# Patient Record
Sex: MALE | Race: WHITE | NOT HISPANIC OR LATINO | URBAN - METROPOLITAN AREA
[De-identification: names, ages, dates, MRNs, and addresses within clinical notes are randomized per-mention and may not be internally consistent; named-entity substitution may affect disease eponyms.]

---

## 2023-12-18 ENCOUNTER — CLAIMS CREATED FROM THE CLAIM WINDOW (OUTPATIENT)
Dept: URBAN - METROPOLITAN AREA CLINIC 40 | Facility: CLINIC | Age: 73
End: 2023-12-18
Payer: MEDICARE

## 2023-12-18 ENCOUNTER — LAB OUTSIDE AN ENCOUNTER (OUTPATIENT)
Dept: URBAN - METROPOLITAN AREA CLINIC 40 | Facility: CLINIC | Age: 73
End: 2023-12-18

## 2023-12-18 VITALS — HEIGHT: 68 IN

## 2023-12-18 DIAGNOSIS — K52.9 CHRONIC DIARRHEA: ICD-10-CM

## 2023-12-18 DIAGNOSIS — K52.9 ILEITIS: ICD-10-CM

## 2023-12-18 DIAGNOSIS — R10.84 GENERALIZED ABDOMINAL PAIN: ICD-10-CM

## 2023-12-18 DIAGNOSIS — K64.8 HEMORRHOIDS, INTERNAL: ICD-10-CM

## 2023-12-18 DIAGNOSIS — R19.7 ACUTE DIARRHEA: ICD-10-CM

## 2023-12-18 PROCEDURE — 99204 OFFICE O/P NEW MOD 45 MIN: CPT | Performed by: INTERNAL MEDICINE

## 2023-12-18 RX ORDER — IBUPROFEN 800 MG/1
TABLET, FILM COATED ORAL
Qty: 18 TABLET | Status: ACTIVE | COMMUNITY

## 2023-12-18 RX ORDER — MUPIROCIN 20 MG/G
APPLY TO SURGERY SITE TWICE A DAY FOR 2 WEEKS THEN USE VASELINE OINTMENT TOPICAL
Qty: 22 GRAM | Refills: 0 | Status: ACTIVE | COMMUNITY

## 2023-12-18 RX ORDER — CEPHALEXIN 500 MG/1
TAKE 1 CAPSULE BY MOUTH THREE TIMES A DAY FOR 7 DAYS CAPSULE ORAL
Qty: 21 EACH | Refills: 0 | Status: ACTIVE | COMMUNITY

## 2023-12-18 RX ORDER — TAMSULOSIN HYDROCHLORIDE 0.4 MG/1
CAPSULE ORAL
Qty: 90 CAPSULE | Status: ACTIVE | COMMUNITY

## 2023-12-18 RX ORDER — COLESTIPOL HYDROCHLORIDE 1 G/1
1 TABLET TABLET, FILM COATED ORAL TWICE A DAY
Qty: 180 TABLET | Refills: 3 | OUTPATIENT
Start: 2023-12-18

## 2023-12-18 RX ORDER — CYCLOBENZAPRINE 5 MG/1
TABLET, FILM COATED ORAL
Qty: 7 TABLET | Status: ACTIVE | COMMUNITY

## 2023-12-18 RX ORDER — AZITHROMYCIN DIHYDRATE 250 MG/1
TAKE 2 TABLETS BY MOUTH TODAY, THEN TAKE 1 TABLET DAILY FOR 4 DAYS TABLET ORAL
Qty: 6 EACH | Refills: 0 | Status: ACTIVE | COMMUNITY

## 2023-12-18 RX ORDER — OMEPRAZOLE 40 MG/1
TAKE 1 CAPSULE BY MOUTH ONCE DAILY CAPSULE, DELAYED RELEASE ORAL
Qty: 90 EACH | Refills: 0 | Status: ACTIVE | COMMUNITY

## 2023-12-18 RX ORDER — NIRMATRELVIR AND RITONAVIR 300-100 MG
TAKE 1 TABLET (ORAL) AS DIRECTED ON DOSE PACK KIT ORAL
Qty: 30 EACH | Refills: 0 | Status: ACTIVE | COMMUNITY

## 2023-12-18 RX ORDER — LORATADINE 10 MG/1
TAKE 1 TABLET BY MOUTH EVERY DAY TABLET ORAL
Qty: 90 EACH | Refills: 0 | Status: ACTIVE | COMMUNITY

## 2023-12-18 RX ORDER — CEPHALEXIN 500 MG/1
CAPSULE ORAL
Qty: 21 CAPSULE | Status: ACTIVE | COMMUNITY

## 2023-12-18 NOTE — HPI-TODAY'S VISIT:
Pt presents for management of chronic diarrhea. Colonoscopy 2021 at [facility], reportedly 1 polyp (told "benign") but no records to confirm. Hx IBS Hx intermittent loose stools, now with loose stools daily, 2-3 daily, no bleeding, no tenesmus. Some cramps with relief after BMs.  Told in the past he had inflammation of "ileum". But no records to confirm. Bloating, gas, cramps. No a/a factors. Hemorrhoids. Pending hemorrhoid surgery 1/2024 in a few weeks with a surgeon at [facility]. Normal wt.  No other red flag s/s.

## 2023-12-22 ENCOUNTER — TELEPHONE ENCOUNTER (OUTPATIENT)
Dept: URBAN - METROPOLITAN AREA CLINIC 40 | Facility: CLINIC | Age: 73
End: 2023-12-22

## 2023-12-22 ENCOUNTER — LAB OUTSIDE AN ENCOUNTER (OUTPATIENT)
Dept: URBAN - METROPOLITAN AREA CLINIC 40 | Facility: CLINIC | Age: 73
End: 2023-12-22

## 2023-12-26 ENCOUNTER — TELEPHONE ENCOUNTER (OUTPATIENT)
Dept: URBAN - METROPOLITAN AREA CLINIC 74 | Facility: CLINIC | Age: 73
End: 2023-12-26

## 2023-12-26 ENCOUNTER — LAB OUTSIDE AN ENCOUNTER (OUTPATIENT)
Dept: URBAN - METROPOLITAN AREA CLINIC 74 | Facility: CLINIC | Age: 73
End: 2023-12-26

## 2023-12-27 LAB
ADENOVIRUS F 40/41: NOT DETECTED
CALPROTECTIN, STOOL - QDX: (no result)
CAMPYLOBACTER: NOT DETECTED
CLOSTRIDIUM DIFFICILE: NOT DETECTED
ENTAMOEBA HISTOLYTICA: NOT DETECTED
ENTEROAGGREGATIVE E.COLI: NOT DETECTED
ENTEROTOXIGENIC E.COLI: NOT DETECTED
ESCHERICHIA COLI O157: NOT DETECTED
GIARDIA LAMBLIA: NOT DETECTED
NOROVIRUS GI/GII: NOT DETECTED
PANCREATICELASTASE ELISA, STOOL: (no result)
ROTAVIRUS A: NOT DETECTED
SALMONELLA SPP.: NOT DETECTED
SHIGA-LIKE TOXIN PRODUCING E.COLI: NOT DETECTED
SHIGELLA SPP. / ENTEROINVASIVE E.COLI: NOT DETECTED
VIBRIO PARAHAEMOLYTICUS: NOT DETECTED
VIBRIO SPP.: NOT DETECTED
YERSINIA ENTEROCOLITICA: NOT DETECTED

## 2024-01-15 ENCOUNTER — WEB ENCOUNTER (OUTPATIENT)
Dept: URBAN - METROPOLITAN AREA CLINIC 40 | Facility: CLINIC | Age: 74
End: 2024-01-15

## 2024-01-25 ENCOUNTER — TELEPHONE ENCOUNTER (OUTPATIENT)
Dept: URBAN - METROPOLITAN AREA CLINIC 74 | Facility: CLINIC | Age: 74
End: 2024-01-25

## 2024-02-15 ENCOUNTER — COLON (OUTPATIENT)
Dept: URBAN - METROPOLITAN AREA SURGERY CENTER 30 | Facility: SURGERY CENTER | Age: 74
End: 2024-02-15

## 2024-02-22 ENCOUNTER — OV EP (OUTPATIENT)
Dept: URBAN - METROPOLITAN AREA CLINIC 40 | Facility: CLINIC | Age: 74
End: 2024-02-22

## 2024-03-15 ENCOUNTER — OV EP (OUTPATIENT)
Dept: URBAN - METROPOLITAN AREA CLINIC 40 | Facility: CLINIC | Age: 74
End: 2024-03-15

## 2024-03-15 RX ORDER — NIRMATRELVIR AND RITONAVIR 300-100 MG
TAKE 1 TABLET (ORAL) AS DIRECTED ON DOSE PACK KIT ORAL
Qty: 30 EACH | Refills: 0 | COMMUNITY

## 2024-03-15 RX ORDER — CYCLOBENZAPRINE 5 MG/1
TABLET, FILM COATED ORAL
Qty: 7 TABLET | COMMUNITY

## 2024-03-15 RX ORDER — MUPIROCIN 20 MG/G
APPLY TO SURGERY SITE TWICE A DAY FOR 2 WEEKS THEN USE VASELINE OINTMENT TOPICAL
Qty: 22 GRAM | Refills: 0 | COMMUNITY

## 2024-03-15 RX ORDER — LORATADINE 10 MG/1
TAKE 1 TABLET BY MOUTH EVERY DAY TABLET ORAL
Qty: 90 EACH | Refills: 0 | COMMUNITY

## 2024-03-15 RX ORDER — CEPHALEXIN 500 MG/1
TAKE 1 CAPSULE BY MOUTH THREE TIMES A DAY FOR 7 DAYS CAPSULE ORAL
Qty: 21 EACH | Refills: 0 | COMMUNITY

## 2024-03-15 RX ORDER — IBUPROFEN 800 MG/1
TABLET, FILM COATED ORAL
Qty: 18 TABLET | COMMUNITY

## 2024-03-15 RX ORDER — COLESTIPOL HYDROCHLORIDE 1 G/1
1 TABLET TABLET, FILM COATED ORAL TWICE A DAY
Qty: 180 TABLET | Refills: 3 | COMMUNITY
Start: 2023-12-18

## 2024-03-15 RX ORDER — OMEPRAZOLE 40 MG/1
TAKE 1 CAPSULE BY MOUTH ONCE DAILY CAPSULE, DELAYED RELEASE ORAL
Qty: 90 EACH | Refills: 0 | COMMUNITY

## 2024-03-15 RX ORDER — AZITHROMYCIN DIHYDRATE 250 MG/1
TAKE 2 TABLETS BY MOUTH TODAY, THEN TAKE 1 TABLET DAILY FOR 4 DAYS TABLET ORAL
Qty: 6 EACH | Refills: 0 | COMMUNITY

## 2024-03-15 RX ORDER — TAMSULOSIN HYDROCHLORIDE 0.4 MG/1
CAPSULE ORAL
Qty: 90 CAPSULE | COMMUNITY

## 2024-03-15 RX ORDER — CEPHALEXIN 500 MG/1
CAPSULE ORAL
Qty: 21 CAPSULE | COMMUNITY

## 2024-03-21 ENCOUNTER — COLON (OUTPATIENT)
Dept: URBAN - METROPOLITAN AREA SURGERY CENTER 30 | Facility: SURGERY CENTER | Age: 74
End: 2024-03-21
Payer: MEDICARE

## 2024-03-21 ENCOUNTER — LAB (OUTPATIENT)
Dept: URBAN - METROPOLITAN AREA CLINIC 4 | Facility: CLINIC | Age: 74
End: 2024-03-21
Payer: MEDICARE

## 2024-03-21 DIAGNOSIS — K50.00 CROHN'S DISEASE OF SMALL INTESTINE WITHOUT COMPLICATIONS: ICD-10-CM

## 2024-03-21 DIAGNOSIS — R93.3 ABN FINDINGS-GI TRACT: ICD-10-CM

## 2024-03-21 DIAGNOSIS — R19.7 ACUTE DIARRHEA: ICD-10-CM

## 2024-03-21 PROCEDURE — 45380 COLONOSCOPY AND BIOPSY: CPT | Performed by: INTERNAL MEDICINE

## 2024-03-21 PROCEDURE — 88305 TISSUE EXAM BY PATHOLOGIST: CPT | Performed by: PATHOLOGY

## 2024-03-21 RX ORDER — COLESTIPOL HYDROCHLORIDE 1 G/1
1 TABLET TABLET, FILM COATED ORAL TWICE A DAY
Qty: 180 TABLET | Refills: 3 | COMMUNITY
Start: 2023-12-18

## 2024-03-21 RX ORDER — TAMSULOSIN HYDROCHLORIDE 0.4 MG/1
CAPSULE ORAL
Qty: 90 CAPSULE | COMMUNITY

## 2024-03-21 RX ORDER — NIRMATRELVIR AND RITONAVIR 300-100 MG
TAKE 1 TABLET (ORAL) AS DIRECTED ON DOSE PACK KIT ORAL
Qty: 30 EACH | Refills: 0 | COMMUNITY

## 2024-03-21 RX ORDER — OMEPRAZOLE 40 MG/1
TAKE 1 CAPSULE BY MOUTH ONCE DAILY CAPSULE, DELAYED RELEASE ORAL
Qty: 90 EACH | Refills: 0 | COMMUNITY

## 2024-03-21 RX ORDER — CEPHALEXIN 500 MG/1
TAKE 1 CAPSULE BY MOUTH THREE TIMES A DAY FOR 7 DAYS CAPSULE ORAL
Qty: 21 EACH | Refills: 0 | COMMUNITY

## 2024-03-21 RX ORDER — CYCLOBENZAPRINE 5 MG/1
TABLET, FILM COATED ORAL
Qty: 7 TABLET | COMMUNITY

## 2024-03-21 RX ORDER — IBUPROFEN 800 MG/1
TABLET, FILM COATED ORAL
Qty: 18 TABLET | COMMUNITY

## 2024-03-21 RX ORDER — CEPHALEXIN 500 MG/1
CAPSULE ORAL
Qty: 21 CAPSULE | COMMUNITY

## 2024-03-21 RX ORDER — MUPIROCIN 20 MG/G
APPLY TO SURGERY SITE TWICE A DAY FOR 2 WEEKS THEN USE VASELINE OINTMENT TOPICAL
Qty: 22 GRAM | Refills: 0 | COMMUNITY

## 2024-03-21 RX ORDER — LORATADINE 10 MG/1
TAKE 1 TABLET BY MOUTH EVERY DAY TABLET ORAL
Qty: 90 EACH | Refills: 0 | COMMUNITY

## 2024-03-21 RX ORDER — AZITHROMYCIN DIHYDRATE 250 MG/1
TAKE 2 TABLETS BY MOUTH TODAY, THEN TAKE 1 TABLET DAILY FOR 4 DAYS TABLET ORAL
Qty: 6 EACH | Refills: 0 | COMMUNITY

## 2024-03-21 NOTE — HPI-TODAY'S VISIT:
Colonoscopy 3/21/24 conirmed moderate ileitis and likely Crohn's disease. Bx pending. He is doing well on Colestipol bid. Diarrhea resolved, no pain. Fecal Calprotectin elevated 120.3. EPI negative. Plan: Follow up to review pathology and start Rx on follow up, likely Entocort.

## 2024-04-19 ENCOUNTER — OV EP (OUTPATIENT)
Dept: URBAN - METROPOLITAN AREA CLINIC 40 | Facility: CLINIC | Age: 74
End: 2024-04-19
Payer: MEDICARE

## 2024-04-19 VITALS
HEIGHT: 68 IN | WEIGHT: 201 LBS | BODY MASS INDEX: 30.46 KG/M2 | SYSTOLIC BLOOD PRESSURE: 164 MMHG | DIASTOLIC BLOOD PRESSURE: 66 MMHG | TEMPERATURE: 97.7 F | OXYGEN SATURATION: 97 % | HEART RATE: 97 BPM

## 2024-04-19 DIAGNOSIS — R19.7 ACUTE DIARRHEA: ICD-10-CM

## 2024-04-19 DIAGNOSIS — R10.84 GENERALIZED ABDOMINAL PAIN: ICD-10-CM

## 2024-04-19 DIAGNOSIS — K52.9 ILEITIS: ICD-10-CM

## 2024-04-19 DIAGNOSIS — K50.00 CICATRIZING ENTEROCOLITIS: ICD-10-CM

## 2024-04-19 PROBLEM — 56689002: Status: ACTIVE | Noted: 2024-04-19

## 2024-04-19 PROCEDURE — 99214 OFFICE O/P EST MOD 30 MIN: CPT | Performed by: INTERNAL MEDICINE

## 2024-04-19 RX ORDER — OMEPRAZOLE 40 MG/1
TAKE 1 CAPSULE BY MOUTH ONCE DAILY CAPSULE, DELAYED RELEASE ORAL
Qty: 90 EACH | Refills: 0 | Status: ACTIVE | COMMUNITY

## 2024-04-19 RX ORDER — EZETIMIBE 10 MG/1
1 TABLET TABLET ORAL ONCE A DAY
Status: ACTIVE | COMMUNITY

## 2024-04-19 RX ORDER — LORATADINE 10 MG/1
TAKE 1 TABLET BY MOUTH EVERY DAY TABLET ORAL
Qty: 90 EACH | Refills: 0 | Status: ACTIVE | COMMUNITY

## 2024-04-19 RX ORDER — MUPIROCIN 20 MG/G
APPLY TO SURGERY SITE TWICE A DAY FOR 2 WEEKS THEN USE VASELINE OINTMENT TOPICAL
Qty: 22 GRAM | Refills: 0 | Status: ON HOLD | COMMUNITY

## 2024-04-19 RX ORDER — CEPHALEXIN 500 MG/1
TAKE 1 CAPSULE BY MOUTH THREE TIMES A DAY FOR 7 DAYS CAPSULE ORAL
Qty: 21 EACH | Refills: 0 | Status: ON HOLD | COMMUNITY

## 2024-04-19 RX ORDER — NIRMATRELVIR AND RITONAVIR 300-100 MG
TAKE 1 TABLET (ORAL) AS DIRECTED ON DOSE PACK KIT ORAL
Qty: 30 EACH | Refills: 0 | Status: ON HOLD | COMMUNITY

## 2024-04-19 RX ORDER — BUDESONIDE 3 MG/1
3 CAPSULES CAPSULE, DELAYED RELEASE PELLETS ORAL ONCE A DAY
Qty: 270 CAPSULE | Refills: 3 | OUTPATIENT
Start: 2024-04-19

## 2024-04-19 RX ORDER — COLESTIPOL HYDROCHLORIDE 1 G/1
1 TABLET TABLET, FILM COATED ORAL TWICE A DAY
Qty: 180 TABLET | Refills: 3 | OUTPATIENT

## 2024-04-19 RX ORDER — TAMSULOSIN HYDROCHLORIDE 0.4 MG/1
CAPSULE ORAL
Qty: 90 CAPSULE | Status: ACTIVE | COMMUNITY

## 2024-04-19 RX ORDER — AZITHROMYCIN DIHYDRATE 250 MG/1
TAKE 2 TABLETS BY MOUTH TODAY, THEN TAKE 1 TABLET DAILY FOR 4 DAYS TABLET ORAL
Qty: 6 EACH | Refills: 0 | Status: ON HOLD | COMMUNITY

## 2024-04-19 RX ORDER — CEPHALEXIN 500 MG/1
CAPSULE ORAL
Qty: 21 CAPSULE | Status: ON HOLD | COMMUNITY

## 2024-04-19 RX ORDER — CYCLOBENZAPRINE 5 MG/1
TABLET, FILM COATED ORAL
Qty: 7 TABLET | Status: ON HOLD | COMMUNITY

## 2024-04-19 RX ORDER — COLESTIPOL HYDROCHLORIDE 1 G/1
1 TABLET TABLET, FILM COATED ORAL TWICE A DAY
Qty: 180 TABLET | Refills: 3 | Status: ACTIVE | COMMUNITY
Start: 2023-12-18

## 2024-04-19 RX ORDER — IBUPROFEN 800 MG/1
TABLET, FILM COATED ORAL
Qty: 18 TABLET | Status: ACTIVE | COMMUNITY

## 2024-04-19 NOTE — HPI-TODAY'S VISIT:
-Colonoscopy 3/21/24 conirmed moderate ileitis and likely Crohn's disease. -CT enterography confirmed ileitis, moderate inflammation of the distal ileum without proximal dilation.  No evidence of fistula or abscess.  Colon was normal.  Bladder thickened.  Suspicion for Crohn's disease. -Colonoscopy March 2024 confirmed Crohn's disease of the terminal ileum, moderate active ileitis without stricture.  Biopsies from the ileum confirmed chronic active ileitis.  Biopsies from the colon were negative. -He presented initially for evaluation of hemorrhoids.  He was anticipating hemorrhoid surgery.  I now suspect he has perianal Crohn's disease along with his hemorrhoids and I do not recommend surgery. -He is doing well on Colestipol bid. -Diarrhea resolved, no pain. Fecal Calprotectin elevated 120.3. EPI negative.  Hx IBS Hx intermittent loose stools, now with loose stools daily, 2-3 daily, no bleeding, no tenesmus. Some cramps with relief after BMs.  Told in the past he had inflammation of "ileum". But no records to confirm. Bloating, gas, cramps. No a/a factors. Hemorrhoids. Pending hemorrhoid surgery 1/2024 in a few weeks with a surgeon at [place]. Normal wt.  No other red flag s/s.

## 2024-07-19 ENCOUNTER — DASHBOARD ENCOUNTERS (OUTPATIENT)
Age: 74
End: 2024-07-19

## 2024-07-19 ENCOUNTER — OFFICE VISIT (OUTPATIENT)
Dept: URBAN - METROPOLITAN AREA CLINIC 40 | Facility: CLINIC | Age: 74
End: 2024-07-19
Payer: MEDICARE

## 2024-07-19 VITALS
TEMPERATURE: 97 F | HEIGHT: 68 IN | DIASTOLIC BLOOD PRESSURE: 92 MMHG | HEART RATE: 68 BPM | BODY MASS INDEX: 29.31 KG/M2 | WEIGHT: 193.4 LBS | SYSTOLIC BLOOD PRESSURE: 136 MMHG

## 2024-07-19 DIAGNOSIS — R10.84 GENERALIZED ABDOMINAL PAIN: ICD-10-CM

## 2024-07-19 DIAGNOSIS — K59.1 CHRONIC FUNCTIONAL DIARRHEA: ICD-10-CM

## 2024-07-19 DIAGNOSIS — K64.8 HEMORRHOIDS, INTERNAL: ICD-10-CM

## 2024-07-19 DIAGNOSIS — K50.00 CROHN'S DISEASE OF SMALL INTESTINE WITHOUT COMPLICATION: ICD-10-CM

## 2024-07-19 PROCEDURE — 99214 OFFICE O/P EST MOD 30 MIN: CPT | Performed by: INTERNAL MEDICINE

## 2024-07-19 RX ORDER — TAMSULOSIN HYDROCHLORIDE 0.4 MG/1
CAPSULE ORAL
Qty: 90 CAPSULE | Status: ACTIVE | COMMUNITY

## 2024-07-19 RX ORDER — NIRMATRELVIR AND RITONAVIR 300-100 MG
TAKE 1 TABLET (ORAL) AS DIRECTED ON DOSE PACK KIT ORAL
Qty: 30 EACH | Refills: 0 | Status: ACTIVE | COMMUNITY

## 2024-07-19 RX ORDER — CEPHALEXIN 500 MG/1
CAPSULE ORAL
Qty: 21 CAPSULE | Status: ACTIVE | COMMUNITY

## 2024-07-19 RX ORDER — OMEPRAZOLE 40 MG/1
TAKE 1 CAPSULE BY MOUTH ONCE DAILY CAPSULE, DELAYED RELEASE ORAL
Qty: 90 EACH | Refills: 0 | Status: ACTIVE | COMMUNITY

## 2024-07-19 RX ORDER — COLESTIPOL HYDROCHLORIDE 1 G/1
1 TABLET TABLET, FILM COATED ORAL TWICE A DAY
Qty: 180 TABLET | Refills: 3 | Status: ACTIVE | COMMUNITY

## 2024-07-19 RX ORDER — MUPIROCIN 20 MG/G
APPLY TO SURGERY SITE TWICE A DAY FOR 2 WEEKS THEN USE VASELINE OINTMENT TOPICAL
Qty: 22 GRAM | Refills: 0 | Status: ACTIVE | COMMUNITY

## 2024-07-19 RX ORDER — LORATADINE 10 MG/1
TAKE 1 TABLET BY MOUTH EVERY DAY TABLET ORAL
Qty: 90 EACH | Refills: 0 | Status: ACTIVE | COMMUNITY

## 2024-07-19 RX ORDER — AZITHROMYCIN DIHYDRATE 250 MG/1
TAKE 2 TABLETS BY MOUTH TODAY, THEN TAKE 1 TABLET DAILY FOR 4 DAYS TABLET ORAL
Qty: 6 EACH | Refills: 0 | Status: ACTIVE | COMMUNITY

## 2024-07-19 RX ORDER — IBUPROFEN 800 MG/1
TABLET, FILM COATED ORAL
Qty: 18 TABLET | Status: ACTIVE | COMMUNITY

## 2024-07-19 RX ORDER — EZETIMIBE 10 MG/1
1 TABLET TABLET ORAL ONCE A DAY
Status: ACTIVE | COMMUNITY

## 2024-07-19 RX ORDER — COLESTIPOL HYDROCHLORIDE 1 G/1
1 TABLET TABLET, FILM COATED ORAL TWICE A DAY
Qty: 180 TABLET | Refills: 3 | OUTPATIENT

## 2024-07-19 RX ORDER — CEPHALEXIN 500 MG/1
TAKE 1 CAPSULE BY MOUTH THREE TIMES A DAY FOR 7 DAYS CAPSULE ORAL
Qty: 21 EACH | Refills: 0 | Status: ACTIVE | COMMUNITY

## 2024-07-19 RX ORDER — BUDESONIDE 3 MG/1
3 CAPSULES CAPSULE, DELAYED RELEASE PELLETS ORAL ONCE A DAY
Qty: 270 CAPSULE | Refills: 3 | Status: ACTIVE | COMMUNITY
Start: 2024-04-19

## 2024-07-19 RX ORDER — CYCLOBENZAPRINE 5 MG/1
TABLET, FILM COATED ORAL
Qty: 7 TABLET | Status: ACTIVE | COMMUNITY

## 2024-07-19 NOTE — HPI-TODAY'S VISIT:
-Follow up Crohn's disease. Now on Entocort 9mg daily and here for 3 month follow up and review of response. -Colonoscopy 3/21/24 conirmed moderate ileitis and likely Crohn's disease. -CT enterography confirmed ileitis, moderate inflammation of the distal ileum without proximal dilation.  No evidence of fistula or abscess.  Colon was normal.  Bladder thickened.  Suspicion for Crohn's disease. -Colonoscopy March 2024 confirmed Crohn's disease of the terminal ileum, moderate active ileitis without stricture.  Biopsies from the ileum confirmed chronic active ileitis.  Biopsies from the colon were negative. -He presented initially for evaluation of hemorrhoids.  He was anticipating hemorrhoid surgery.  I now suspect he has perianal Crohn's disease along with his hemorrhoids and I do not recommend surgery. -He is doing well on Colestipol bid. -Diarrhea resolved, no pain. Fecal Calprotectin elevated 120.3. EPI negative.  Hx IBS Hx intermittent loose stools, now with loose stools daily, 2-3 daily, no bleeding, no tenesmus. Some cramps with relief after BMs.  Told in the past he had inflammation of "ileum". But no records to confirm. Bloating, gas, cramps. No a/a factors. Hemorrhoids. Pending hemorrhoid surgery 1/2024 in a few weeks with a surgeon at [place]. Normal wt.  No other red flag s/s.

## 2024-08-21 ENCOUNTER — WEB ENCOUNTER (OUTPATIENT)
Dept: URBAN - METROPOLITAN AREA CLINIC 40 | Facility: CLINIC | Age: 74
End: 2024-08-21

## 2024-08-21 RX ORDER — BUDESONIDE 3 MG/1
3 CAPSULES CAPSULE, DELAYED RELEASE PELLETS ORAL ONCE A DAY
Qty: 270 CAPSULE | Refills: 3 | OUTPATIENT
Start: 2024-08-21

## 2024-09-25 ENCOUNTER — OFFICE VISIT (OUTPATIENT)
Dept: URBAN - METROPOLITAN AREA CLINIC 40 | Facility: CLINIC | Age: 74
End: 2024-09-25

## 2024-09-25 RX ORDER — AZITHROMYCIN DIHYDRATE 250 MG/1
TAKE 2 TABLETS BY MOUTH TODAY, THEN TAKE 1 TABLET DAILY FOR 4 DAYS TABLET ORAL
Qty: 6 EACH | Refills: 0 | COMMUNITY

## 2024-09-25 RX ORDER — BUDESONIDE 3 MG/1
3 CAPSULES CAPSULE, DELAYED RELEASE PELLETS ORAL ONCE A DAY
Qty: 270 CAPSULE | Refills: 3 | COMMUNITY
Start: 2024-08-21

## 2024-09-25 RX ORDER — CEPHALEXIN 500 MG/1
CAPSULE ORAL
Qty: 21 CAPSULE | COMMUNITY

## 2024-09-25 RX ORDER — CEPHALEXIN 500 MG/1
TAKE 1 CAPSULE BY MOUTH THREE TIMES A DAY FOR 7 DAYS CAPSULE ORAL
Qty: 21 EACH | Refills: 0 | COMMUNITY

## 2024-09-25 RX ORDER — EZETIMIBE 10 MG/1
1 TABLET TABLET ORAL ONCE A DAY
COMMUNITY

## 2024-09-25 RX ORDER — COLESTIPOL HYDROCHLORIDE 1 G/1
1 TABLET TABLET, FILM COATED ORAL TWICE A DAY
Qty: 180 TABLET | Refills: 3 | COMMUNITY

## 2024-09-25 RX ORDER — LORATADINE 10 MG/1
TAKE 1 TABLET BY MOUTH EVERY DAY TABLET ORAL
Qty: 90 EACH | Refills: 0 | COMMUNITY

## 2024-09-25 RX ORDER — TAMSULOSIN HYDROCHLORIDE 0.4 MG/1
CAPSULE ORAL
Qty: 90 CAPSULE | COMMUNITY

## 2024-09-25 RX ORDER — BUDESONIDE 3 MG/1
3 CAPSULES CAPSULE, DELAYED RELEASE PELLETS ORAL ONCE A DAY
Qty: 270 CAPSULE | Refills: 3 | COMMUNITY
Start: 2024-04-19

## 2024-09-25 RX ORDER — MUPIROCIN 20 MG/G
APPLY TO SURGERY SITE TWICE A DAY FOR 2 WEEKS THEN USE VASELINE OINTMENT TOPICAL
Qty: 22 GRAM | Refills: 0 | COMMUNITY

## 2024-09-25 RX ORDER — OMEPRAZOLE 40 MG/1
TAKE 1 CAPSULE BY MOUTH ONCE DAILY CAPSULE, DELAYED RELEASE ORAL
Qty: 90 EACH | Refills: 0 | COMMUNITY

## 2024-09-25 RX ORDER — CYCLOBENZAPRINE 5 MG/1
TABLET, FILM COATED ORAL
Qty: 7 TABLET | COMMUNITY

## 2024-09-25 RX ORDER — IBUPROFEN 800 MG/1
TABLET, FILM COATED ORAL
Qty: 18 TABLET | COMMUNITY

## 2024-09-25 RX ORDER — NIRMATRELVIR AND RITONAVIR 300-100 MG
TAKE 1 TABLET (ORAL) AS DIRECTED ON DOSE PACK KIT ORAL
Qty: 30 EACH | Refills: 0 | COMMUNITY

## 2024-10-11 ENCOUNTER — OFFICE VISIT (OUTPATIENT)
Dept: URBAN - METROPOLITAN AREA CLINIC 40 | Facility: CLINIC | Age: 74
End: 2024-10-11

## 2024-11-18 ENCOUNTER — OFFICE VISIT (OUTPATIENT)
Dept: URBAN - METROPOLITAN AREA CLINIC 40 | Facility: CLINIC | Age: 74
End: 2024-11-18
Payer: MEDICARE

## 2024-11-18 VITALS
HEART RATE: 76 BPM | WEIGHT: 197.2 LBS | SYSTOLIC BLOOD PRESSURE: 146 MMHG | DIASTOLIC BLOOD PRESSURE: 99 MMHG | HEIGHT: 68 IN | BODY MASS INDEX: 29.89 KG/M2 | TEMPERATURE: 97.9 F

## 2024-11-18 DIAGNOSIS — K52.9 ILEITIS: ICD-10-CM

## 2024-11-18 DIAGNOSIS — R93.3 ABNORMAL CT SCAN, SMALL BOWEL: ICD-10-CM

## 2024-11-18 DIAGNOSIS — K64.8 HEMORRHOIDS, INTERNAL: ICD-10-CM

## 2024-11-18 DIAGNOSIS — K50.00 CROHN'S DISEASE OF SMALL INTESTINE WITHOUT COMPLICATION: ICD-10-CM

## 2024-11-18 DIAGNOSIS — R10.84 GENERALIZED ABDOMINAL PAIN: ICD-10-CM

## 2024-11-18 PROCEDURE — 99213 OFFICE O/P EST LOW 20 MIN: CPT | Performed by: INTERNAL MEDICINE

## 2024-11-18 RX ORDER — BUDESONIDE 3 MG/1
3 CAPSULES CAPSULE, DELAYED RELEASE PELLETS ORAL ONCE A DAY
Qty: 270 CAPSULE | Refills: 3 | Status: ACTIVE | COMMUNITY
Start: 2024-04-19

## 2024-11-18 RX ORDER — COLESTIPOL HYDROCHLORIDE 1 G/1
1 TABLET TABLET, FILM COATED ORAL TWICE A DAY
Qty: 180 TABLET | Refills: 3 | OUTPATIENT

## 2024-11-18 RX ORDER — IBUPROFEN 800 MG/1
TABLET, FILM COATED ORAL
Qty: 18 TABLET | Status: ACTIVE | COMMUNITY

## 2024-11-18 RX ORDER — EZETIMIBE 10 MG/1
1 TABLET TABLET ORAL ONCE A DAY
Status: ACTIVE | COMMUNITY

## 2024-11-18 RX ORDER — CEPHALEXIN 500 MG/1
TAKE 1 CAPSULE BY MOUTH THREE TIMES A DAY FOR 7 DAYS CAPSULE ORAL
Qty: 21 EACH | Refills: 0 | Status: ACTIVE | COMMUNITY

## 2024-11-18 RX ORDER — AZITHROMYCIN DIHYDRATE 250 MG/1
TAKE 2 TABLETS BY MOUTH TODAY, THEN TAKE 1 TABLET DAILY FOR 4 DAYS TABLET ORAL
Qty: 6 EACH | Refills: 0 | Status: ACTIVE | COMMUNITY

## 2024-11-18 RX ORDER — CYCLOBENZAPRINE 5 MG/1
TABLET, FILM COATED ORAL
Qty: 7 TABLET | Status: ACTIVE | COMMUNITY

## 2024-11-18 RX ORDER — NIRMATRELVIR AND RITONAVIR 300-100 MG
TAKE 1 TABLET (ORAL) AS DIRECTED ON DOSE PACK KIT ORAL
Qty: 30 EACH | Refills: 0 | Status: ACTIVE | COMMUNITY

## 2024-11-18 RX ORDER — CEPHALEXIN 500 MG/1
CAPSULE ORAL
Qty: 21 CAPSULE | Status: ACTIVE | COMMUNITY

## 2024-11-18 RX ORDER — TAMSULOSIN HYDROCHLORIDE 0.4 MG/1
CAPSULE ORAL
Qty: 90 CAPSULE | Status: ACTIVE | COMMUNITY

## 2024-11-18 RX ORDER — COLESTIPOL HYDROCHLORIDE 1 G/1
1 TABLET TABLET, FILM COATED ORAL TWICE A DAY
Qty: 180 TABLET | Refills: 3 | Status: ACTIVE | COMMUNITY

## 2024-11-18 RX ORDER — MUPIROCIN 20 MG/G
APPLY TO SURGERY SITE TWICE A DAY FOR 2 WEEKS THEN USE VASELINE OINTMENT TOPICAL
Qty: 22 GRAM | Refills: 0 | Status: ACTIVE | COMMUNITY

## 2024-11-18 RX ORDER — LORATADINE 10 MG/1
TAKE 1 TABLET BY MOUTH EVERY DAY TABLET ORAL
Qty: 90 EACH | Refills: 0 | Status: ACTIVE | COMMUNITY

## 2024-11-18 RX ORDER — BUDESONIDE 3 MG/1
3 CAPSULES CAPSULE, DELAYED RELEASE PELLETS ORAL ONCE A DAY
Qty: 270 CAPSULE | Refills: 3 | Status: ACTIVE | COMMUNITY
Start: 2024-08-21

## 2024-11-18 RX ORDER — OMEPRAZOLE 40 MG/1
TAKE 1 CAPSULE BY MOUTH ONCE DAILY CAPSULE, DELAYED RELEASE ORAL
Qty: 90 EACH | Refills: 0 | Status: ACTIVE | COMMUNITY

## 2024-11-18 NOTE — PHYSICAL EXAM HENT:
Head, normocephalic, atraumatic, Face, Face within normal limits, Ears, External ears within normal limits, Nose/Nasopharynx, External nose normal appearance, nares patent, no nasal discharge, Mouth and Throat, Oral cavity appearance normal, Lips, Appearance normal Shante Smith into talk with patient and Mom. Laura Wilson RN

## 2024-11-18 NOTE — HPI-TODAY'S VISIT:
-Follow up Crohn's disease.  -He responded well to Entocort 9mg daily for 3 months of Rx and last visit 4 months ago we stopped Rx. Here to review clinical response off Rx.   ======= Prior note: -Colonoscopy 3/21/24 conirmed moderate ileitis and likely Crohn's disease. -CT enterography confirmed ileitis, moderate inflammation of the distal ileum without proximal dilation.  No evidence of fistula or abscess.  Colon was normal.  Bladder thickened.  Suspicion for Crohn's disease. -Colonoscopy March 2024 confirmed Crohn's disease of the terminal ileum, moderate active ileitis without stricture.  Biopsies from the ileum confirmed chronic active ileitis.  Biopsies from the colon were negative. -He presented initially for evaluation of hemorrhoids.  He was anticipating hemorrhoid surgery.  I now suspect he has perianal Crohn's disease along with his hemorrhoids and I do not recommend surgery. -He is doing well on Colestipol bid. -Diarrhea resolved, no pain. Fecal Calprotectin elevated 120.3. EPI negative.  Hx IBS Hx intermittent loose stools, now with loose stools daily, 2-3 daily, no bleeding, no tenesmus. Some cramps with relief after BMs.  Told in the past he had inflammation of "ileum". But no records to confirm. Bloating, gas, cramps. No a/a factors. Hemorrhoids. Pending hemorrhoid surgery 1/2024 in a few weeks with a surgeon at [place]. Normal wt.  No other red flag s/s.

## 2024-11-20 ENCOUNTER — P2P PATIENT RECORD (OUTPATIENT)
Age: 74
End: 2024-11-20

## 2025-01-17 ENCOUNTER — WEB ENCOUNTER (OUTPATIENT)
Dept: URBAN - METROPOLITAN AREA CLINIC 40 | Facility: CLINIC | Age: 75
End: 2025-01-17

## 2025-01-17 RX ORDER — COLESTIPOL HYDROCHLORIDE 1 G/1
1 TABLET TABLET, FILM COATED ORAL TWICE A DAY
Qty: 180 TABLET | Refills: 3